# Patient Record
Sex: FEMALE | Race: WHITE | Employment: OTHER | URBAN - METROPOLITAN AREA
[De-identification: names, ages, dates, MRNs, and addresses within clinical notes are randomized per-mention and may not be internally consistent; named-entity substitution may affect disease eponyms.]

---

## 2018-07-23 ENCOUNTER — HOSPITAL ENCOUNTER (OUTPATIENT)
Dept: VASCULAR SURGERY | Age: 68
Discharge: HOME | End: 2018-07-23
Attending: FAMILY MEDICINE
Payer: MEDICARE

## 2018-07-23 DIAGNOSIS — R09.89 RIGHT CAROTID BRUIT: ICD-10-CM

## 2018-07-23 PROCEDURE — 93880 EXTRACRANIAL BILAT STUDY: CPT

## 2018-07-23 NOTE — PROCEDURES
[facility]  *** FINAL REPORT ***    Name: Sergo Garcia  MRN: [id number]    Outpatient  : [address] 1950  HIS Order #: [id number]  [id number] [facility] Visit #: [id number]  Date: 2018    TYPE OF TEST: Cerebrovascular Duplex    REASON FOR TEST  Carotid bruit (right hemisphere)    Right Carotid:-             Proximal               Mid                 Distal  cm/s  Systolic  Diastolic  Systolic  Diastolic  Systolic  Diastolic  CCA:    305.2      12.0                            59.0      13.6  Bulb:  ECA:     78.4      16.3  ICA:     41.0      11.1       46.0      17.5       58.1      16.8  ICA/CCA:  0.7       0.8    ICA Stenosis: <50%    Right Vertebral:-  Finding: Antegrade  Sys:       27.7  Marilou:        9.6    Right Subclavian: Stenotic    Left Carotid:-            Proximal                Mid                 Distal  cm/s  Systolic  Diastolic  Systolic  Diastolic  Systolic  Diastolic  CCA:     33.3      17.6                            73.3      17.6  Bulb:  ECA:     87.5      18.9  ICA:     31.6      12.9       34.9      15.1       68.6      25.0  ICA/CCA:  0.4       0.7    ICA Stenosis: <50%    Left Vertebral:-  Finding: Antegrade  Sys:       38.9  Marilou:        6.1    Left Subclavian:    INTERPRETATION/FINDINGS  PROCEDURE:  Evaluation of the extracranial cerebrovascular arteries  with ultrasound (B-mode imaging, pulsed Doppler, color Doppler). Includes the common carotid, internal carotid, external carotid, and  vertebral arteries. FINDINGS: Mild Heterogeneous plaque in the bilateral bulb, bilateral  ECA, and bilateral ICA. IMPRESSION: Findings are consistent with 0-49% stenosis of the right  internal carotid and 0-49% stenosis of the left internal carotid. Vertebrals are patent with antegrade flow. NOTE: Elevated velocities  with spectral broadening identified in the right subclaivian artey  consistent with stenosis.     ADDITIONAL COMMENTS    I have personally reviewed the data relevant to the interpretation of  this  study. TECHNOLOGIST: NKECHI Arita  Signed: 07/23/2018 10:50 AM    PHYSICIAN: Syed Herrera.  Del Pace MD  Signed: 07/23/2018 03:10 PM

## 2018-08-04 LAB
CREATININE, EXTERNAL: 0.59
HBA1C MFR BLD HPLC: 5 %
LDL-C, EXTERNAL: 75
MICROALBUMIN UR TEST STR-MCNC: 42.2 MG/DL

## 2018-08-10 PROBLEM — I10 ESSENTIAL HYPERTENSION: Status: ACTIVE | Noted: 2018-08-10

## 2018-08-10 PROBLEM — I65.29 CAROTID ARTERY STENOSIS: Status: ACTIVE | Noted: 2018-08-10

## 2018-08-10 RX ORDER — ESOMEPRAZOLE MAGNESIUM 40 MG/1
CAPSULE, DELAYED RELEASE ORAL DAILY
COMMUNITY
End: 2020-06-18 | Stop reason: SDUPTHER

## 2018-08-10 RX ORDER — LOSARTAN POTASSIUM 50 MG/1
TABLET ORAL DAILY
COMMUNITY
End: 2018-08-22 | Stop reason: SDUPTHER

## 2018-08-10 RX ORDER — GUAIFENESIN 100 MG/5ML
81 LIQUID (ML) ORAL DAILY
COMMUNITY
End: 2019-05-29

## 2018-08-22 ENCOUNTER — OFFICE VISIT (OUTPATIENT)
Dept: FAMILY MEDICINE CLINIC | Age: 68
End: 2018-08-22

## 2018-08-22 VITALS
RESPIRATION RATE: 16 BRPM | HEART RATE: 89 BPM | BODY MASS INDEX: 24.32 KG/M2 | TEMPERATURE: 97.7 F | DIASTOLIC BLOOD PRESSURE: 100 MMHG | OXYGEN SATURATION: 97 % | HEIGHT: 65 IN | SYSTOLIC BLOOD PRESSURE: 178 MMHG | WEIGHT: 146 LBS

## 2018-08-22 DIAGNOSIS — I65.21 STENOSIS OF RIGHT CAROTID ARTERY: Primary | ICD-10-CM

## 2018-08-22 DIAGNOSIS — I10 ESSENTIAL HYPERTENSION: ICD-10-CM

## 2018-08-22 RX ORDER — CHLORHEXIDINE GLUCONATE 1.2 MG/ML
RINSE ORAL
COMMUNITY
Start: 2018-07-25 | End: 2019-05-29

## 2018-08-22 RX ORDER — ESOMEPRAZOLE MAGNESIUM 40 MG/1
40 CAPSULE, DELAYED RELEASE ORAL
COMMUNITY
End: 2018-08-22 | Stop reason: SDUPTHER

## 2018-08-22 RX ORDER — LOSARTAN POTASSIUM 50 MG/1
100 TABLET ORAL DAILY
Qty: 90 TAB | Refills: 1 | Status: SHIPPED | OUTPATIENT
Start: 2018-08-22 | End: 2018-09-17 | Stop reason: SDUPTHER

## 2018-08-22 RX ORDER — IBUPROFEN 600 MG/1
TABLET ORAL
COMMUNITY
Start: 2018-07-25 | End: 2019-05-29

## 2018-08-22 RX ORDER — HYDROCODONE BITARTRATE AND ACETAMINOPHEN 7.5; 325 MG/1; MG/1
TABLET ORAL
COMMUNITY
Start: 2018-07-25 | End: 2018-08-22 | Stop reason: ALTCHOICE

## 2018-08-22 NOTE — MR AVS SNAPSHOT
[address] [place] 
[phone] Patient: Mata Lopez MRN: [id number]:[id number] Visit Information Date & Time Provider Department Dept. Phone Encounter #  
 8/22/2018  9:45 AM Christie Leavitt MD [facility] 144 [phone] [id number] Follow-up Instructions Return in about 4 weeks (around 9/19/2018) for for high blood pressure follow up. Follow-up and Disposition History Upcoming Health Maintenance Date Due Hepatitis C Screening 1950 DTaP/Tdap/Td series (1 - Tdap) 6/15/1972 BREAST CANCER SCRN MAMMOGRAM 6/15/2001 FOBT Q 1 YEAR AGE 50-75 6/15/2001 ZOSTER VACCINE AGE 60> 4/15/2011 GLAUCOMA SCREENING Q2Y 6/15/2016 Bone Densitometry (Dexa) Screening 6/15/2016 Pneumococcal 65+ Low/Medium Risk (1 of 2 - PCV13) 6/15/2016 MEDICARE YEARLY EXAM 7/19/2018 Influenza Age 5 to Adult 8/1/2018 Allergies as of 8/22/2018  Review Complete On: 8/22/2018 By: Christie Leavitt MD  
 No Known Allergies Current Immunizations  Never Reviewed No immunizations on file. Not reviewed this visit You Were Diagnosed With   
  
 Codes Comments Stenosis of right carotid artery    -  Primary ICD-10-CM: U71.06 ICD-9-CM: 433.10 Essential hypertension     ICD-10-CM: I10 
ICD-9-CM: 401.9 Vitals BP Pulse Temp Resp Height(growth percentile) Weight(growth percentile) (!) 178/100 (BP 1 Location: Left arm, BP Patient Position: Sitting) 89 97.7 °F (36.5 °C) (Oral) 16 5' 5\" (1.651 m) 146 lb (66.2 kg) SpO2 BMI OB Status Smoking Status 97% 24.3 kg/m2 Postmenopausal Current Every Day Smoker BMI and BSA Data Body Mass Index Body Surface Area  
 24.3 kg/m 2 1.74 m 2 Preferred Pharmacy Pharmacy Name Phone [facility] [address] [facility], [address] [place] [address], [address] [phone] Your Updated Medication List  
  
   
 This list is accurate as of 8/22/18 11:08 AM.  Always use your most recent med list.  
  
  
  
  
 aspirin 81 mg chewable tablet Take 81 mg by mouth daily. chlorhexidine 0.12 % solution Commonly known as:  PERIDEX  
  
 ibuprofen 600 mg tablet Commonly known as:  MOTRIN  
  
 losartan 50 mg tablet Commonly known as:  COZAAR Take 2 Tabs by mouth daily. NexIUM 40 mg capsule Generic drug:  esomeprazole Take  by mouth daily. Prescriptions Sent to Pharmacy Refills  
 losartan (COZAAR) 50 mg tablet 1 Sig: Take 2 Tabs by mouth daily. Class: Normal  
 Pharmacy: [facility], [address] [place] [address], [address] #: [phone] Route: Oral  
  
Follow-up Instructions Return in about 4 weeks (around 9/19/2018) for for high blood pressure follow up. Introducing [facility]! Ino Machuca introduces [employer] patient portal. Now you can access parts of your medical record, email your doctor's office, and request medication refills online. 1. In your internet browser, go to [email or web address] 2. Click on the First Time User? Click Here link in the Sign In box. You will see the New Member Sign Up page. 3. Enter your [employer] Access Code exactly as it appears below. You will not need to use this code after youve completed the sign-up process. If you do not sign up before the expiration date, you must request a new code. · [employer] Access Code: [email or web address] Expires: 10/21/2018 10:07 AM 
 
4. Enter the last four digits of your Social Security Number (xxxx) and Date of Birth (mm/dd/yyyy) as indicated and click Submit. You will be taken to the next sign-up page. 5. Create a [employer] ID. This will be your [employer] login ID and cannot be changed, so think of one that is secure and easy to remember. 6. Create a [employer] password. You can change your password at any time. 7. Enter your Password Reset Question and Answer. This can be used at a later time if you forget your password. 8. Enter your e-mail address. You will receive e-mail notification when new information is available in [address] 9. Click Sign Up. You can now view and download portions of your medical record. 10. Click the Download Summary menu link to download a portable copy of your medical information. If you have questions, please visit the Frequently Asked Questions section of the [employer] website. Remember, [employer] is NOT to be used for urgent needs. For medical emergencies, dial 911. Now available from your iPhone and Android! Please provide this summary of care documentation to your next provider. Your primary care clinician is listed as Sanjuan Opitz. If you have any questions after today's visit, please call [phone].

## 2018-08-22 NOTE — PROGRESS NOTES
Pt here to follow-up of hypertension and carotid dopple study and determine medication refill and adjustments and appropriate lab evaluation. Pt is compliant with medications and no new side effects. Chief Complaint   Patient presents with    Medication Management     was started on 2 new meds at last apt. here to follow up          Past Medical History:   Diagnosis Date    Depression     Hypertension          Current Outpatient Prescriptions   Medication Sig Dispense Refill    losartan (COZAAR) 50 mg tablet Take 2 Tabs by mouth daily. 90 Tab 1    aspirin 81 mg chewable tablet Take 81 mg by mouth daily.  chlorhexidine (PERIDEX) 0.12 % solution       ibuprofen (MOTRIN) 600 mg tablet       esomeprazole (NEXIUM) 40 mg capsule Take  by mouth daily. Requested Prescriptions     Signed Prescriptions Disp Refills    losartan (COZAAR) 50 mg tablet 90 Tab 1     Sig: Take 2 Tabs by mouth daily. Review of Systems   Constitutional: Negative for fever. Respiratory: Negative for shortness of breath. Cardiovascular: Negative for chest pain, orthopnea and PND. Gastrointestinal: Negative for abdominal pain, nausea and vomiting. Physical Exam   Constitutional: She is oriented to person, place, and time and well-developed, well-nourished, and in no distress. Cardiovascular: Normal rate, regular rhythm and normal heart sounds. Pulmonary/Chest: Effort normal and breath sounds normal. No respiratory distress. She has no wheezes. Neurological: She is alert and oriented to person, place, and time. Skin: Skin is warm and dry. 1. Stenosis of right carotid artery  Carotid doppers showed no significant stenosis. We will continueon aspirin. 2. Essential hypertension  Not well controlled. Increase Losartan. Plan:     See us in 4 weeks.

## 2018-08-22 NOTE — PROGRESS NOTES
1. Have you been to the ER, urgent care clinic since your last visit? Hospitalized since your last visit? No    2. Have you seen or consulted any other health care providers outside of the [facility] since your last visit? Include any pap smears or colon screening. No      Patient reports  and brother in law were killed in a car accident in March, reports depression, increased alcohol ,lack of appetite and insomnia self treating with marijuana us. Patient report anxiety.  Reports she cut herself in the past.

## 2018-09-18 RX ORDER — LOSARTAN POTASSIUM 50 MG/1
100 TABLET ORAL DAILY
Qty: 90 TAB | Refills: 1 | Status: SHIPPED | OUTPATIENT
Start: 2018-09-18 | End: 2019-04-15 | Stop reason: SDUPTHER

## 2019-05-29 ENCOUNTER — OFFICE VISIT (OUTPATIENT)
Dept: FAMILY MEDICINE CLINIC | Age: 69
End: 2019-05-29

## 2019-05-29 VITALS
HEIGHT: 65 IN | SYSTOLIC BLOOD PRESSURE: 149 MMHG | WEIGHT: 146 LBS | RESPIRATION RATE: 16 BRPM | OXYGEN SATURATION: 96 % | TEMPERATURE: 98 F | HEART RATE: 86 BPM | BODY MASS INDEX: 24.32 KG/M2 | DIASTOLIC BLOOD PRESSURE: 92 MMHG

## 2019-05-29 DIAGNOSIS — J30.1 SEASONAL ALLERGIC RHINITIS DUE TO POLLEN: ICD-10-CM

## 2019-05-29 DIAGNOSIS — I65.21 STENOSIS OF RIGHT CAROTID ARTERY: ICD-10-CM

## 2019-05-29 DIAGNOSIS — I10 ESSENTIAL HYPERTENSION: Primary | ICD-10-CM

## 2019-05-29 RX ORDER — LOSARTAN POTASSIUM AND HYDROCHLOROTHIAZIDE 12.5; 1 MG/1; MG/1
1 TABLET ORAL DAILY
Qty: 90 TAB | Refills: 1 | Status: SHIPPED | OUTPATIENT
Start: 2019-05-29 | End: 2020-06-18 | Stop reason: SDUPTHER

## 2019-05-29 RX ORDER — ROSUVASTATIN CALCIUM 5 MG/1
5 TABLET, COATED ORAL
Qty: 90 TAB | Refills: 1 | Status: SHIPPED | OUTPATIENT
Start: 2019-05-29 | End: 2020-06-18 | Stop reason: SDUPTHER

## 2019-05-29 RX ORDER — BISMUTH SUBSALICYLATE 262 MG
1 TABLET,CHEWABLE ORAL DAILY
COMMUNITY

## 2019-05-29 RX ORDER — ESOMEPRAZOLE MAGNESIUM 40 MG/1
40 CAPSULE, DELAYED RELEASE ORAL
COMMUNITY
End: 2019-05-29

## 2019-05-29 NOTE — PATIENT INSTRUCTIONS
Decrease or stop alcohol Stop smoking !!! For allergies Claritin (Generic OTC) Flonase (generic OTC) Hydrocortisone daily around eyes Niurka Teixeira made stress B complex for skin cancer prevention Restart aspirin Start cholesterol medicine and new BP medicine

## 2019-05-29 NOTE — PROGRESS NOTES
Assessment and Plan    1. Essential hypertension  Not at goal  Stop smoking  Decrease or stop ETOH  Stop current losartan and start:  - losartan-hydroCHLOROthiazide (HYZAAR) 100-12.5 mg per tablet; Take 1 Tab by mouth daily. Dispense: 90 Tab; Refill: 1    2. Stenosis of right carotid artery  Resume aspirin,   Start statin  - rosuvastatin (CRESTOR) 5 mg tablet; Take 1 Tab by mouth nightly. Dispense: 90 Tab; Refill: 1    3. Seasonal allergic rhinitis due to pollen  Claritin, flonase, hydrocortisone    Nicatinamide for skin CA prevention      Follow-up and Dispositions    · Return in about 1 month (around 6/29/2019) for Blood pressure follow up. Diagnosis and plan discussed with patient who verbillized understanding. History of present Margareth Ponce is a 80 y.o. female presenting for Follow-up (Medication ) and Allergies    Hypertension  Not at goal here. On losartan only  6 light beers per night    Carotid artery stenosis  Stopped ASA due to stomach upset  Still smoking  Not on chol meds    Allergic rhinitis  Currently untreated. Usually spring only  Watery eyes, swollen lids and runny nose    Review of Systems   Respiratory: Negative for shortness of breath. Cardiovascular: Negative for chest pain, palpitations and leg swelling. Gastrointestinal: Negative for abdominal pain, blood in stool and constipation. Genitourinary: Negative for hematuria. Neurological: Negative for dizziness, speech change, focal weakness, loss of consciousness, weakness and headaches. Psychiatric/Behavioral: Positive for depression (still grieving loss of  in MVA). The patient is nervous/anxious and has insomnia.           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    HX ORTHOPAEDIC      Metal plates in neck    HX OTHER SURGICAL      eye surgery    HX TONSILLECTOMY       Family History   Problem Relation Age of Onset    Heart Disease Father      Social History     Socioeconomic History    Marital status:      Spouse name: Not on file    Number of children: Not on file    Years of education: Not on file    Highest education level: Not on file   Occupational History    Not on file   Social Needs    Financial resource strain: Not on file    Food insecurity:     Worry: Not on file     Inability: Not on file    Transportation needs:     Medical: Not on file     Non-medical: Not on file   Tobacco Use    Smoking status: Current Every Day Smoker     Packs/day: 1.00     Years: 55.00     Pack years: 55.00    Smokeless tobacco: Never Used   Substance and Sexual Activity    Alcohol use:  Yes     Alcohol/week: 14.4 oz     Types: 24 Cans of beer per week     Comment: 24 beer/ week, doubled intake since  passed    Drug use: Yes     Frequency: 7.0 times per week     Types: Marijuana     Comment: Patient states she smokes marijuana daily    Sexual activity: Not Currently     Partners: Male   Lifestyle    Physical activity:     Days per week: Not on file     Minutes per session: Not on file    Stress: Not on file   Relationships    Social connections:     Talks on phone: Not on file     Gets together: Not on file     Attends Jew service: Not on file     Active member of club or organization: Not on file     Attends meetings of clubs or organizations: Not on file     Relationship status: Not on file    Intimate partner violence:     Fear of current or ex partner: Not on file     Emotionally abused: Not on file     Physically abused: Not on file     Forced sexual activity: Not on file   Other Topics Concern    Not on file   Social History Narrative    Not on file         Current Outpatient Medications   Medication Sig Dispense Refill    ferrous sulfate (SLOW FE) 142 mg (45 mg iron) ER tablet Take  by mouth Daily (before breakfast).  multivitamin (ONE A DAY) tablet Take 1 Tab by mouth daily.  losartan-hydroCHLOROthiazide (HYZAAR) 100-12.5 mg per tablet Take 1 Tab by mouth daily. 90 Tab 1    rosuvastatin (CRESTOR) 5 mg tablet Take 1 Tab by mouth nightly. 90 Tab 1    esomeprazole (NEXIUM) 40 mg capsule Take  by mouth daily. No Known Allergies    Vitals:    05/29/19 0930   BP: (!) 149/92   Pulse: 86   Resp: 16   Temp: 98 °F (36.7 °C)   TempSrc: Oral   SpO2: 96%   Weight: 146 lb (66.2 kg)   Height: 5' 5\" (1.651 m)     Body mass index is 24.3 kg/m². Objective  General: Patient alert and oriented and in NAD, smells of tobacco  Eyes: PER/EOMI, no conjunctival pallor or scleral icterus. Swollen edematous lids with injection of conj bilat  ENT: Nares normal with clear rhinorrhea, TM's clear with normal architecture and light reflex, Mouth normal, throat without exudate, uvula midline  Neck: No thyromegaly or cervical lymphadenopathy  Cardiovascular: Heart has regular rate and rhythm, No murmurs, rubs or gallops. No edema + right carotid bruit  Respiratory: Lungs are clear to auscultation bilaterally, no wheezing, rales or rhonchi, normal chest excursion and no increased work of breathing. Musculoskeletal: All four extremities present and functional.   Skin: No rashes or lesions noted on exposed skin  Neuro: AAOx3, normal gait and speech. No gross neurologic deficits. Psych: Appropriate mood and affect, no homicidal or suicidal ideation, no obsessions, delusions or hallucinations, normal psychomotor status.

## 2019-05-29 NOTE — PROGRESS NOTES
Berneda Leyden is a 80 y.o. female      Chief Complaint   Patient presents with    Follow-up     Medication     Allergies         1. Have you been to the ER, urgent care clinic since your last visit? Hospitalized since your last visit? no      2. Have you seen or consulted any other health care providers outside of the [address] [place] [address] since your last visit? Include any pap smears or colon screening. no

## 2020-06-17 DIAGNOSIS — I10 ESSENTIAL HYPERTENSION: ICD-10-CM

## 2020-06-17 DIAGNOSIS — K21.9 GASTROESOPHAGEAL REFLUX DISEASE WITHOUT ESOPHAGITIS: Primary | ICD-10-CM

## 2020-06-17 RX ORDER — LOSARTAN POTASSIUM AND HYDROCHLOROTHIAZIDE 12.5; 1 MG/1; MG/1
TABLET ORAL
Qty: 90 TAB | Refills: 1 | OUTPATIENT
Start: 2020-06-17

## 2020-06-18 ENCOUNTER — VIRTUAL VISIT (OUTPATIENT)
Dept: FAMILY MEDICINE CLINIC | Age: 70
End: 2020-06-18

## 2020-06-18 DIAGNOSIS — I10 ESSENTIAL HYPERTENSION: Primary | ICD-10-CM

## 2020-06-18 DIAGNOSIS — K21.9 GASTROESOPHAGEAL REFLUX DISEASE WITHOUT ESOPHAGITIS: ICD-10-CM

## 2020-06-18 DIAGNOSIS — I65.21 STENOSIS OF RIGHT CAROTID ARTERY: ICD-10-CM

## 2020-06-18 RX ORDER — ASPIRIN 81 MG/1
TABLET ORAL DAILY
COMMUNITY

## 2020-06-18 RX ORDER — ROSUVASTATIN CALCIUM 5 MG/1
5 TABLET, COATED ORAL
Qty: 90 TAB | Refills: 1 | Status: SHIPPED | OUTPATIENT
Start: 2020-06-18 | End: 2020-12-17

## 2020-06-18 RX ORDER — LOSARTAN POTASSIUM AND HYDROCHLOROTHIAZIDE 12.5; 1 MG/1; MG/1
1 TABLET ORAL DAILY
Qty: 90 TAB | Refills: 1 | Status: SHIPPED | OUTPATIENT
Start: 2020-06-18 | End: 2020-10-12 | Stop reason: DRUGHIGH

## 2020-06-18 RX ORDER — ESOMEPRAZOLE MAGNESIUM 40 MG/1
40 CAPSULE, DELAYED RELEASE ORAL DAILY
Qty: 90 CAP | Refills: 1 | Status: SHIPPED | OUTPATIENT
Start: 2020-06-18 | End: 2020-06-25 | Stop reason: ALTCHOICE

## 2020-06-18 NOTE — PROGRESS NOTES
Kun Zacarias is a 72 y.o. female evaluated via telephone on 6/18/2020. Consent:  She and/or health care decision maker is aware that she may receive a bill for this telephone service, depending on her insurance coverage, and has provided verbal consent to proceed: Yes      Documentation:  I communicated with the patient and/or health care decision maker about medications, BP, and other issues. Details of this discussion including any medical advice provided:     Hypertension  Stopped all her meds, ran out  BP elevated at home to 167/99    Hypercholesterolelmia  Out of that med too    GERD  Lots of heartburn. Out of PPI  Still smoking but less ETOH than in past.    Lots of other sx: intermittent diarrhea, chest pain and burning, anxiety about health and COVID, Stress, house has water damage. On discussion, she seems stressed and thinking and speech seems somewhat disjointed and tangential.  Discussed that for severe chest pain she should go to ER especially if continues with resumption of PPI  Will see in office to review these multiple issues in person    Assessment and Plan:    1. Stenosis of right carotid artery  Resume statin  - rosuvastatin (CRESTOR) 5 mg tablet; Take 1 Tab by mouth nightly. Dispense: 90 Tab; Refill: 1    2. Essential hypertension  Resume BP meds for stroke prevention  - losartan-hydroCHLOROthiazide (HYZAAR) 100-12.5 mg per tablet; Take 1 Tab by mouth daily. Dispense: 90 Tab; Refill: 1    3. Gastroesophageal reflux disease without esophagitis  Symptomatic currently. - esomeprazole (NexIUM) 40 mg capsule; Take 1 Cap by mouth daily. Dispense: 90 Cap; Refill: 1      Follow-up and Dispositions    · Return in about 10 days (around 6/28/2020) for Medication follow up, Blood pressure follow up. I affirm this is a Patient Initiated Episode with a Patient who has not had a related appointment within my department in the past 7 days or scheduled within the next 24 hours.     Total Time: minutes: 21-30 minutes    Note: not billable if this call serves to triage the patient into an appointment for the relevant concern    The patient was at home and I was in office for this phone visit.     Selma Arias MD

## 2020-06-25 RX ORDER — OMEPRAZOLE 40 MG/1
40 CAPSULE, DELAYED RELEASE ORAL DAILY
Qty: 90 CAP | Refills: 1 | Status: SHIPPED | OUTPATIENT
Start: 2020-06-25

## 2020-06-25 NOTE — TELEPHONE ENCOUNTER
06/25/20  9:15 AM    1. Gastroesophageal reflux disease without esophagitis  Replaces Nexium  - omeprazole (PRILOSEC) 40 mg capsule; Take 1 Cap by mouth daily. Dispense: 90 Cap;  Refill: 1      Paulie Peres MD

## 2020-10-12 ENCOUNTER — OFFICE VISIT (OUTPATIENT)
Dept: FAMILY MEDICINE CLINIC | Age: 70
End: 2020-10-12
Payer: MEDICARE

## 2020-10-12 VITALS
DIASTOLIC BLOOD PRESSURE: 106 MMHG | HEART RATE: 87 BPM | SYSTOLIC BLOOD PRESSURE: 158 MMHG | RESPIRATION RATE: 12 BRPM | TEMPERATURE: 97.1 F | OXYGEN SATURATION: 100 %

## 2020-10-12 DIAGNOSIS — F33.2 SEVERE EPISODE OF RECURRENT MAJOR DEPRESSIVE DISORDER, WITHOUT PSYCHOTIC FEATURES (HCC): ICD-10-CM

## 2020-10-12 DIAGNOSIS — H60.543 ECZEMA OF EXTERNAL EAR, BILATERAL: ICD-10-CM

## 2020-10-12 DIAGNOSIS — I10 ESSENTIAL HYPERTENSION: Primary | ICD-10-CM

## 2020-10-12 DIAGNOSIS — Z23 ENCOUNTER FOR IMMUNIZATION: ICD-10-CM

## 2020-10-12 PROCEDURE — G8536 NO DOC ELDER MAL SCRN: HCPCS | Performed by: FAMILY MEDICINE

## 2020-10-12 PROCEDURE — 3017F COLORECTAL CA SCREEN DOC REV: CPT | Performed by: FAMILY MEDICINE

## 2020-10-12 PROCEDURE — G8427 DOCREV CUR MEDS BY ELIG CLIN: HCPCS | Performed by: FAMILY MEDICINE

## 2020-10-12 PROCEDURE — G8755 DIAS BP > OR = 90: HCPCS | Performed by: FAMILY MEDICINE

## 2020-10-12 PROCEDURE — 1101F PT FALLS ASSESS-DOCD LE1/YR: CPT | Performed by: FAMILY MEDICINE

## 2020-10-12 PROCEDURE — G8421 BMI NOT CALCULATED: HCPCS | Performed by: FAMILY MEDICINE

## 2020-10-12 PROCEDURE — 99214 OFFICE O/P EST MOD 30 MIN: CPT | Performed by: FAMILY MEDICINE

## 2020-10-12 PROCEDURE — G8753 SYS BP > OR = 140: HCPCS | Performed by: FAMILY MEDICINE

## 2020-10-12 PROCEDURE — G8511 SCR DEP POS, NO PLAN DOC RNG: HCPCS | Performed by: FAMILY MEDICINE

## 2020-10-12 PROCEDURE — G8400 PT W/DXA NO RESULTS DOC: HCPCS | Performed by: FAMILY MEDICINE

## 2020-10-12 PROCEDURE — 1090F PRES/ABSN URINE INCON ASSESS: CPT | Performed by: FAMILY MEDICINE

## 2020-10-12 RX ORDER — TRIAMCINOLONE ACETONIDE 1 MG/G
OINTMENT TOPICAL 2 TIMES DAILY
Qty: 30 G | Refills: 0 | Status: SHIPPED | OUTPATIENT
Start: 2020-10-12

## 2020-10-12 RX ORDER — ESCITALOPRAM OXALATE 10 MG/1
10 TABLET ORAL DAILY
Qty: 30 TAB | Refills: 0 | Status: SHIPPED | OUTPATIENT
Start: 2020-10-12 | End: 2020-11-17 | Stop reason: SDUPTHER

## 2020-10-12 RX ORDER — HYDROXYZINE 25 MG/1
25-50 TABLET, FILM COATED ORAL
Qty: 30 TAB | Refills: 0 | Status: SHIPPED | OUTPATIENT
Start: 2020-10-12 | End: 2020-11-17 | Stop reason: SDUPTHER

## 2020-10-12 RX ORDER — LOSARTAN POTASSIUM AND HYDROCHLOROTHIAZIDE 25; 100 MG/1; MG/1
1 TABLET ORAL DAILY
Qty: 90 TAB | Refills: 0 | Status: SHIPPED | OUTPATIENT
Start: 2020-10-12 | End: 2020-11-17 | Stop reason: SDUPTHER

## 2020-10-12 NOTE — PROGRESS NOTES
Identified pt with two pt identifiers(name and ). Reviewed record in preparation for visit and have obtained necessary documentation. Chief Complaint   Patient presents with    Hypertension     Discuss high bp    Dry Skin     Discuss Alternative for cream     Anxiety     Discuss Lexapro         Health Maintenance Due   Topic    Hepatitis C Screening     DTaP/Tdap/Td series (1 - Tdap)    Shingrix Vaccine Age 50> (1 of 2)    Breast Cancer Screen Mammogram     FOBT Q1Y Age 54-65    Vannie Pulling GLAUCOMA SCREENING Q2Y     Bone Densitometry (Dexa) Screening     Pneumococcal 65+ years (1 of 1 - PPSV23)    Medicare Yearly Exam     Lipid Screen     Flu Vaccine (1)       Coordination of Care Questionnaire:  :   1) Have you been to an emergency room, urgent care, or hospitalized since your last visit? If yes, where when, and reason for visit? no      2. Have seen or consulted any other health care provider since your last visit? If yes, where when, and reason for visit? no        Patient is accompanied by self I have received verbal consent from Antonio Kong to discuss any/all medical information while they are present in the room.

## 2020-10-12 NOTE — PROGRESS NOTES
Antonio Kong  72 y.o. female  1950  [id number]    [facility]  Progress Note     Encounter Date: 10/12/2020    Assessment and Plan:     Encounter Diagnoses     ICD-10-CM ICD-9-CM   1. Essential hypertension  I10 401.9   2. Eczema of external ear, bilateral  H60.543 380.22   3. Severe episode of recurrent major depressive disorder, without psychotic features ([facility] [address]  F33.2 296.33   4. Encounter for immunization  Z23 V03.89       1. Essential hypertension  Increase dose of HCTZ. Recheck labs in 1 week with labs to be done at that time. - losartan-hydroCHLOROthiazide (HYZAAR) 100-25 mg per tablet; Take 1 Tab by mouth daily. Dispense: 90 Tab; Refill: 0    2. Eczema of external ear, bilateral  - triamcinolone acetonide (KENALOG) 0.1 % ointment; Apply  to affected area two (2) times a day. use thin layer  Dispense: 30 g; Refill: 0    3. Severe episode of recurrent major depressive disorder, without psychotic features [facility])  Patient looking into group seessions. Start medication. Close follow up in clinic in 1 week. - escitalopram oxalate (LEXAPRO) 10 mg tablet; Take 1 Tab by mouth daily. Dispense: 30 Tab; Refill: 0  - hydrOXYzine HCL (ATARAX) 25 mg tablet; Take 1-2 Tabs by mouth three (3) times daily as needed for Anxiety. Dispense: 30 Tab; Refill: 0    4. Encounter for immunization  - pneumococcal 23-karyn ps vaccine (PNEUMOVAX 23) 25 mcg/0.5 mL injection; 0.5 mL by IntraMUSCular route once for 1 dose. Please fax confirmation to the attn of prescribing provider at above fax number. Indications: prevention of Streptococcus pneumoniae infection  Dispense: 0.5 mL; Refill: 0      I have discussed the diagnosis with the patient and the intended plan as seen in the above orders. she has expressed understanding. The patient has received an after-visit summary and questions were answered concerning future plans.   I have discussed medication side effects and warnings with the patient as well. Electronically Signed: Leon Hackett MD    Current Medications after this visit     Current Outpatient Medications   Medication Sig    esomeprazole magnesium (NEXIUM PO) Take  by mouth.  losartan-hydroCHLOROthiazide (HYZAAR) 100-25 mg per tablet Take 1 Tab by mouth daily.  triamcinolone acetonide (KENALOG) 0.1 % ointment Apply  to affected area two (2) times a day. use thin layer    escitalopram oxalate (LEXAPRO) 10 mg tablet Take 1 Tab by mouth daily.  hydrOXYzine HCL (ATARAX) 25 mg tablet Take 1-2 Tabs by mouth three (3) times daily as needed for Anxiety.  pneumococcal 23-karyn ps vaccine (PNEUMOVAX 23) 25 mcg/0.5 mL injection 0.5 mL by IntraMUSCular route once for 1 dose. Please fax confirmation to the attn of prescribing provider at above fax number. Indications: prevention of Streptococcus pneumoniae infection    omeprazole (PRILOSEC) 40 mg capsule Take 1 Cap by mouth daily.  aspirin delayed-release 81 mg tablet Take  by mouth daily.  ferrous sulfate (SLOW FE) 142 mg (45 mg iron) ER tablet Take  by mouth Daily (before breakfast).  multivitamin (ONE A DAY) tablet Take 1 Tab by mouth daily.  rosuvastatin (CRESTOR) 5 mg tablet Take 1 Tab by mouth nightly. No current facility-administered medications for this visit. Medications Discontinued During This Encounter   Medication Reason    losartan-hydroCHLOROthiazide (HYZAAR) 100-12.5 mg per tablet Dose Adjustment     ~~~~~~~~~~~~~~~~~~~~~~~~~~~~~~~~~~~~~~~~~~~~~~    Chief Complaint   Patient presents with    Hypertension     Discuss high bp    Dry Skin     Discuss Alternative for cream     Anxiety     Discuss Lexapro        History provided by patient and daughter. History of Present Illness   Stefani Aguayo is a 72 y.o. female who presents to clinic today for:    Anxiety Review:  Patient is seen for anxiety disorder. Current treatment includes none and no other therapies.  Reported side effects from the treatment: n/a  Prior treatments: Prozac  3 most recent PHQ Screens 10/12/2020   Little interest or pleasure in doing things Nearly every day   Feeling down, depressed, irritable, or hopeless Nearly every day   Total Score PHQ 2 6   Trouble falling or staying asleep, or sleeping too much Nearly every day   Feeling tired or having little energy Nearly every day   Poor appetite, weight loss, or overeating Nearly every day   Feeling bad about yourself - or that you are a failure or have let yourself or your family down Nearly every day   Trouble concentrating on things such as school, work, reading, or watching TV Nearly every day   Moving or speaking so slowly that other people could have noticed; or the opposite being so fidgety that others notice Nearly every day   Thoughts of being better off dead, or hurting yourself in some way Nearly every day   PHQ 9 Score 27   How difficult have these problems made it for you to do your work, take care of your home and get along with others -     OLIVIA 2/7 10/12/2020   Feeling nervous, anxious or on edge? 3   Not being able to stop or control worrying? 3   OLIVIA-2 Subtotal 6   Worrying too much about different things? 3   Trouble relaxing? 3   Being so restless that it is hard to sit still? 3   Becoming easily annoyed or irritable? 3   Feeling afraid as if something awful might happen? 3   OLIVIA-7 Total Score 21           Hypertension: Uncontrolled   BP Readings from Last 3 Encounters:   10/12/20 (!) 158/106   05/29/19 (!) 149/92   08/22/18 (!) 178/100     The patient reports:  taking medications as instructed, no medication side effects noted, no TIA's, no chest pain on exertion, no dyspnea on exertion, no swelling of ankles. Home monitoring:No    Eczema  Patient present with cc of eczema. Patient has been applying hydrocortisone. Health Maintenance  Asked patient to schedule a Wellness appt to discuss issues.   Health Maintenance Due   Topic Date Due    Hepatitis C Screening  1950    DTaP/Tdap/Td series (1 - Tdap) 06/15/1972    Shingrix Vaccine Age 50> (1 of 2) 06/15/2001    Breast Cancer Screen Mammogram  06/15/2001    FOBT Q1Y Age 50-75  06/15/2001    GLAUCOMA SCREENING Q2Y  06/15/2016    Bone Densitometry (Dexa) Screening  06/15/2016    Pneumococcal 65+ years (1 of 1 - PPSV23) 06/15/2016    Medicare Yearly Exam  07/19/2018    Lipid Screen  08/04/2019    Flu Vaccine (1) 09/01/2020     Review of Systems   Review of Systems   Constitutional: Negative for chills and fever. HENT: Negative for congestion, ear discharge and sore throat. Eyes: Negative for double vision, photophobia and discharge. Respiratory: Negative for cough, sputum production, shortness of breath and wheezing. Cardiovascular: Negative for chest pain, palpitations and leg swelling. Gastrointestinal: Negative for diarrhea, nausea and vomiting. Genitourinary: Negative for dysuria and urgency. Skin: Positive for itching and rash. Neurological: Positive for headaches. Negative for dizziness, tingling and tremors. Psychiatric/Behavioral: Positive for depression. Negative for memory loss, substance abuse and suicidal ideas. The patient is nervous/anxious and has insomnia. Vitals/Objective:     Vitals:    10/12/20 1440 10/12/20 1511   BP: (!) 189/109 (!) 158/106   Pulse: 87    Resp: 12    Temp: 97.1 °F (36.2 °C)    SpO2: 100%      There is no height or weight on file to calculate BMI. Wt Readings from Last 3 Encounters:   05/29/19 146 lb (66.2 kg)   08/22/18 146 lb (66.2 kg)       Physical Exam  Constitutional:       General: She is not in acute distress. Appearance: Normal appearance. She is well-developed and normal weight. She is not ill-appearing, toxic-appearing or diaphoretic. HENT:      Head: Normocephalic and atraumatic.       Right Ear: External ear normal.      Left Ear: External ear normal.      Mouth/Throat:      Pharynx: No oropharyngeal exudate or posterior oropharyngeal erythema. Eyes:      General:         Right eye: No discharge. Left eye: No discharge. Conjunctiva/sclera: Conjunctivae normal.   Cardiovascular:      Rate and Rhythm: Normal rate and regular rhythm. Heart sounds: S1 normal and S2 normal. No murmur. Pulmonary:      Effort: Pulmonary effort is normal.      Breath sounds: Normal breath sounds. No rales. Musculoskeletal:      Right lower leg: No edema. Left lower leg: No edema. Skin:     General: Skin is warm and dry. Findings: Rash ( external ear bilaterally dry and cracking with serous drianage) present. Neurological:      Mental Status: She is alert and oriented to person, place, and time. Psychiatric:         Mood and Affect: Mood is anxious and depressed. Affect is labile and tearful. Affect is not blunt, flat, angry or inappropriate. Behavior: Behavior normal.         No results found for this or any previous visit (from the past 24 hour(s)). Disposition     Follow-up and Dispositions  ·   Return in about 1 week (around 10/19/2020) for f/u mood. .       No future appointments. History   Patient's past medical, surgical and family histories were reviewed and updated.     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    HX ORTHOPAEDIC      Metal plates in neck    HX OTHER SURGICAL      eye surgery    HX TONSILLECTOMY       Family History   Problem Relation Age of Onset    Heart Disease Father      Social History     Tobacco Use    Smoking status: Current Every Day Smoker     Packs/day: 1.00     Years: 55.00     Pack years: 55.00    Smokeless tobacco: Never Used   Substance Use Topics    Alcohol use:  Yes     Alcohol/week: 24.0 standard drinks     Types: 24 Cans of beer per week     Comment: 24 beer/ week, doubled intake since  passed    Drug use: Yes     Frequency: 7.0 times per week     Types: Marijuana     Comment: Patient states she smokes marijuana       Allergies   No Known Allergies

## 2020-11-10 ENCOUNTER — TELEPHONE (OUTPATIENT)
Dept: FAMILY MEDICINE CLINIC | Age: 70
End: 2020-11-10

## 2020-11-10 NOTE — TELEPHONE ENCOUNTER
----- Message from Alejandra Juana sent at 11/9/2020  1:17 PM EST -----  Regarding: Dr. Radha Rosario  er (if not patient): N/A  Relationship of caller (if not patient): N/A  Best contact number(s): [phone]  Name of medication and dosage if known: \"Hydroxyzine\" 25mg  Is patient out of this medication (yes/no): No but has 1 week left  Pharmacy name: [facility]  Pharmacy listed in chart? (yes/no): Yes  Pharmacy phone number: [phone]  Date of last visit: 10/12/2020  Details to clarify the request: Pt would like a 90 day supply.

## 2020-11-10 NOTE — TELEPHONE ENCOUNTER
----- Message from Stephen Mendoza sent at 11/9/2020  1:18 PM EST -----  Regarding: : Dr. Karen Martínez (if not patient): N/A  Relationship of caller (if not patient): N/A  Best contact number(s): [phone]  Name of medication and dosage if known: \"Escitalopram\" 10 mg  Is patient out of this medication (yes/no): No but has 1 week left  Pharmacy name: [facility]  Pharmacy listed in chart? (yes/no): Yes  Pharmacy phone number: [phone]  Date of last visit: 10/12/2020  Details to clarify the request: Pt would like a 90 day supply.

## 2020-11-10 NOTE — TELEPHONE ENCOUNTER
Patient was just started on the 2 requested medications on 10/12/2020. SHe was supposed to follow up in 1 week. I do not give 90-days supplies for those medication without a follow up appointment.

## 2020-11-17 ENCOUNTER — VIRTUAL VISIT (OUTPATIENT)
Dept: FAMILY MEDICINE CLINIC | Age: 70
End: 2020-11-17
Payer: MEDICARE

## 2020-11-17 DIAGNOSIS — F33.2 SEVERE EPISODE OF RECURRENT MAJOR DEPRESSIVE DISORDER, WITHOUT PSYCHOTIC FEATURES (HCC): ICD-10-CM

## 2020-11-17 DIAGNOSIS — I10 ESSENTIAL HYPERTENSION: ICD-10-CM

## 2020-11-17 DIAGNOSIS — H60.393 OTHER INFECTIVE ACUTE OTITIS EXTERNA OF BOTH EARS: Primary | ICD-10-CM

## 2020-11-17 PROCEDURE — G8427 DOCREV CUR MEDS BY ELIG CLIN: HCPCS | Performed by: FAMILY MEDICINE

## 2020-11-17 PROCEDURE — 99214 OFFICE O/P EST MOD 30 MIN: CPT | Performed by: FAMILY MEDICINE

## 2020-11-17 PROCEDURE — G8421 BMI NOT CALCULATED: HCPCS | Performed by: FAMILY MEDICINE

## 2020-11-17 PROCEDURE — 3017F COLORECTAL CA SCREEN DOC REV: CPT | Performed by: FAMILY MEDICINE

## 2020-11-17 PROCEDURE — 1090F PRES/ABSN URINE INCON ASSESS: CPT | Performed by: FAMILY MEDICINE

## 2020-11-17 PROCEDURE — G8536 NO DOC ELDER MAL SCRN: HCPCS | Performed by: FAMILY MEDICINE

## 2020-11-17 PROCEDURE — G8756 NO BP MEASURE DOC: HCPCS | Performed by: FAMILY MEDICINE

## 2020-11-17 PROCEDURE — G8432 DEP SCR NOT DOC, RNG: HCPCS | Performed by: FAMILY MEDICINE

## 2020-11-17 PROCEDURE — 1101F PT FALLS ASSESS-DOCD LE1/YR: CPT | Performed by: FAMILY MEDICINE

## 2020-11-17 PROCEDURE — G8400 PT W/DXA NO RESULTS DOC: HCPCS | Performed by: FAMILY MEDICINE

## 2020-11-17 RX ORDER — HYDROXYZINE 25 MG/1
25-50 TABLET, FILM COATED ORAL
Qty: 90 TAB | Refills: 0 | Status: SHIPPED | OUTPATIENT
Start: 2020-11-17 | End: 2020-12-10 | Stop reason: SDUPTHER

## 2020-11-17 RX ORDER — NEOMYCIN SULFATE, POLYMYXIN B SULFATE AND HYDROCORTISONE 10; 3.5; 1 MG/ML; MG/ML; [USP'U]/ML
3 SUSPENSION/ DROPS AURICULAR (OTIC) 4 TIMES DAILY
Qty: 10 ML | Refills: 0 | Status: SHIPPED | OUTPATIENT
Start: 2020-11-17 | End: 2020-11-27

## 2020-11-17 RX ORDER — ESCITALOPRAM OXALATE 10 MG/1
10 TABLET ORAL DAILY
Qty: 90 TAB | Refills: 0 | Status: SHIPPED | OUTPATIENT
Start: 2020-11-17 | End: 2021-02-08

## 2020-11-17 RX ORDER — AZELASTINE 1 MG/ML
1 SPRAY, METERED NASAL 2 TIMES DAILY
Qty: 1 BOTTLE | Refills: 2 | Status: SHIPPED | OUTPATIENT
Start: 2020-11-17

## 2020-11-17 RX ORDER — LOSARTAN POTASSIUM AND HYDROCHLOROTHIAZIDE 25; 100 MG/1; MG/1
1 TABLET ORAL DAILY
Qty: 90 TAB | Refills: 0 | Status: SHIPPED | OUTPATIENT
Start: 2020-11-17

## 2020-11-17 NOTE — PROGRESS NOTES
Richie Grayson 72 y.o. female 1950 [id number] 1 Lanie Lechuga Progress Note Encounter Date: 11/17/2020 Assessment and Plan:  
No diagnosis found. *** There are no diagnoses linked to this encounter. I have discussed the diagnosis with the patient and the intended plan as seen in the above orders. she has expressed understanding. The patient has received an after-visit summary and questions were answered concerning future plans. I have discussed medication side effects and warnings with the patient as well. Electronically Signed: Esme Dinero MD 
 
Current Medications after this visit Current Outpatient Medications Medication Sig  esomeprazole magnesium (NEXIUM PO) Take  by mouth.  losartan-hydroCHLOROthiazide (HYZAAR) 100-25 mg per tablet Take 1 Tab by mouth daily.  triamcinolone acetonide (KENALOG) 0.1 % ointment Apply  to affected area two (2) times a day. use thin layer  escitalopram oxalate (LEXAPRO) 10 mg tablet Take 1 Tab by mouth daily.  hydrOXYzine HCL (ATARAX) 25 mg tablet Take 1-2 Tabs by mouth three (3) times daily as needed for Anxiety.  omeprazole (PRILOSEC) 40 mg capsule Take 1 Cap by mouth daily.  aspirin delayed-release 81 mg tablet Take  by mouth daily.  rosuvastatin (CRESTOR) 5 mg tablet Take 1 Tab by mouth nightly.  ferrous sulfate (SLOW FE) 142 mg (45 mg iron) ER tablet Take  by mouth Daily (before breakfast).  multivitamin (ONE A DAY) tablet Take 1 Tab by mouth daily. No current facility-administered medications for this visit. There are no discontinued medications. ~~~~~~~~~~~~~~~~~~~~~~~~~~~~~~~~~~~~~~~~~~~~~~ No chief complaint on file. History provided by {Relatives - adult:5061::\"patient\"} History of Present Illness Richie Grayson is a 72 y.o. female who presents to clinic today for: 
 
{Blank Single Select Template:20061::\"Depression\",\"Anxiety\",\"***\"} Review: Patient is seen for {anxdep:13661}. Ongoig symptoms include: {Depressive Symptoms:54544} {anxiety sx:75605}. Patient denies: {Depressive Symptoms:48286} {anxiety sx:78202}. Current treatment includes {tx:12483} and {anxiety tx:1010}. Reported side effects from the treatment: {side effects:26503} Prior treatments: {tx:59469} 3 most recent PHQ Screens 10/12/2020 Little interest or pleasure in doing things Nearly every day Feeling down, depressed, irritable, or hopeless Nearly every day Total Score PHQ 2 6 Trouble falling or staying asleep, or sleeping too much Nearly every day Feeling tired or having little energy Nearly every day Poor appetite, weight loss, or overeating Nearly every day Feeling bad about yourself - or that you are a failure or have let yourself or your family down Nearly every day Trouble concentrating on things such as school, work, reading, or watching TV Nearly every day Moving or speaking so slowly that other people could have noticed; or the opposite being so fidgety that others notice Nearly every day Thoughts of being better off dead, or hurting yourself in some way Nearly every day PHQ 9 Score 27 How difficult have these problems made it for you to do your work, take care of your home and get along with others -  
 
OLIVIA 2/7 10/12/2020 Feeling nervous, anxious or on edge? 3 Not being able to stop or control worrying? 3 OLIVIA-2 Subtotal 6 Worrying too much about different things? 3 Trouble relaxing? 3 Being so restless that it is hard to sit still? 3 Becoming easily annoyed or irritable? 3 Feeling afraid as if something awful might happen? 3 OLIVIA-7 Total Score 21  
 
 
 
 
 
*** Patient present with cc of *** Health Maintenance {Blank Multiple Select Template:20062::\"VIIS queried and reviewed; updated in chart as appropriate. \",\"Completed by outside provider. Release for records signed. \",\" recommendation discussed and ordered with patient's permission. \",\"*** scheduled with an outside provider; advised to have information forwarded once completed. \",\"Asked patient to schedule a Wellness appt to discuss issues. \"} Health Maintenance Due Topic Date Due  
 Hepatitis C Screening  1950  
 DTaP/Tdap/Td series (1 - Tdap) 06/15/1972  Shingrix Vaccine Age 50> (1 of 2) 06/15/2001  Colorectal Cancer Screening Combo  06/15/2001  Breast Cancer Screen Mammogram  06/15/2001  GLAUCOMA SCREENING Q2Y  06/15/2016  Bone Densitometry (Dexa) Screening  06/15/2016  Pneumococcal 65+ years (2 of 2 - PPSV23) 11/19/2016  Medicare Yearly Exam  07/19/2018  Lipid Screen  08/04/2019 Review of Systems ROS *** Vitals/Objective: There were no vitals filed for this visit. There is no height or weight on file to calculate BMI. Wt Readings from Last 3 Encounters:  
05/29/19 146 lb (66.2 kg) 08/22/18 146 lb (66.2 kg) Physical Exam *** No results found for this or any previous visit (from the past 24 hour(s)). Disposition Future Appointments Date Time Provider Christine Martinez 11/17/2020 11:20 AM Kelly Bowen MD [facility] History Patient's past medical, surgical and family histories were reviewed and updated. Past Medical History:  
Diagnosis Date  Carotid artery stenosis  Depression  GERD (gastroesophageal reflux disease)  Hypercholesteremia  Hypertension  Suicide attempt ([address] Past Surgical History:  
Procedure Laterality Date [address] UNLISTED \"bowel Stinosis correction\"  HX GI    
 HX GYN    
 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    
 eye surgery  HX TONSILLECTOMY Family History Problem Relation Age of Onset  Heart Disease Father Social History Tobacco Use  Smoking status: Current Every Day Smoker Packs/day: 1.00   Years: 55.00  
 Pack years: 55.00  Smokeless tobacco: Never Used Substance Use Topics  Alcohol use: Yes Alcohol/week: 24.0 standard drinks Types: 24 Cans of beer per week Comment: 24 beer/ week, doubled intake since  passed  Drug use: Yes Frequency: 7.0 times per week Types: Marijuana Comment: Patient states she smokes marijuana Allergies No Known Allergies

## 2020-11-17 NOTE — PROGRESS NOTES
Chanda Andrea is a 72 y.o. female      Chief Complaint   Patient presents with    Follow-up     Medication f/u visit          1. Have you been to the ER, urgent care clinic since your last visit? Hospitalized since your last visit? No      2. Have you seen or consulted any other health care providers outside of the [address] [place] [address] since your last visit? Include any pap smears or colon screening.    nO

## 2020-11-17 NOTE — PROGRESS NOTES
Lul Souza  72 y.o. female  1950  COA:[id number]    [facility] [address] [facility]  Progress Note     Encounter Date: 11/17/2020    Lul Souza is a 72 y.o. female who was seen by synchronous (real-time) audio-video technology on 11/17/2020. She and/or her healthcare decision maker is aware that this patient-initiated Telehealth encounter is a billable service, with coverage as determined by her insurance carrier. She  is aware that she may receive a bill and has provided verbal consent to proceed:Yes    I was in the office while conducting this encounter. The patient was checked in/\"roomed\" by Chirag Ken LPN via telephone in the office. The patient was at home during the encounter. This visit was completed virtually using D[employer]. me  Assessment and Plan:     Encounter Diagnoses     ICD-10-CM ICD-9-CM   1. Other infective acute otitis externa of both ears  H60.393 380.10   2. Severe episode of recurrent major depressive disorder, without psychotic features ([address]  F33.2 296.33   3. Essential hypertension  I10 401.9       1. Severe episode of recurrent major depressive disorder, without psychotic features ([address]  Improved. Continue current dose of medication. F/u 3 months. - escitalopram oxalate (LEXAPRO) 10 mg tablet; Take 1 Tab by mouth daily. Dispense: 90 Tab; Refill: 0  - hydrOXYzine HCL (ATARAX) 25 mg tablet; Take 1-2 Tabs by mouth three (3) times daily as needed for Anxiety. Dispense: 90 Tab; Refill: 0    2. Essential hypertension  Improved. Home monitoring at goal. Continue current medication.   - losartan-hydroCHLOROthiazide (HYZAAR) 100-25 mg per tablet; Take 1 Tab by mouth daily. Dispense: 90 Tab; Refill: 0    3. Other infective acute otitis externa of both ears  - azelastine (ASTELIN) 137 mcg (0.1 %) nasal spray; 1 [place] by Both Nostrils route two (2) times a day. Use in each nostril as directed  Dispense: 1 Bottle;  Refill: 2  - neomycin-polymyxin-hydrocortisone, buffered, (PEDIOTIC) 3.5-10,000-1 mg/mL-unit/mL-% otic suspension; Administer 3 Drops in left ear four (4) times daily for 10 days. Dispense: 10 mL; Refill: 0      We discussed the expected course, resolution and complications of the diagnosis(es) in detail. Medication risks, benefits, costs, interactions, and alternatives were discussed as indicated. I advised her to contact the office if her condition worsens, changes or fails to improve as anticipated. She expressed understanding with the diagnosis(es) and plan. CPT Codes 68804-79223 for Established Patients may apply to this Telehealth Visit    Pursuant to the emergency declaration under the [address] [place] [address], [facility] waiver authority and the [employer] Act, this Virtual  Visit was conducted, with patient's consent, to reduce the patient's risk of exposure to COVID-19 and provide continuity of care for an established patient. Services were provided through a video synchronous discussion virtually to substitute for in-person clinic visit. Electronically Signed: Debbi Thomas MD    Current Medications after this visit     Current Outpatient Medications   Medication Sig    escitalopram oxalate (LEXAPRO) 10 mg tablet Take 1 Tab by mouth daily.  losartan-hydroCHLOROthiazide (HYZAAR) 100-25 mg per tablet Take 1 Tab by mouth daily.  hydrOXYzine HCL (ATARAX) 25 mg tablet Take 1-2 Tabs by mouth three (3) times daily as needed for Anxiety.  azelastine (ASTELIN) 137 mcg (0.1 %) nasal spray 1 [place] by Both Nostrils route two (2) times a day. Use in each nostril as directed    neomycin-polymyxin-hydrocortisone, buffered, (PEDIOTIC) 3.5-10,000-1 mg/mL-unit/mL-% otic suspension Administer 3 Drops in left ear four (4) times daily for 10 days.  esomeprazole magnesium (NEXIUM PO) Take  by mouth.     triamcinolone acetonide (KENALOG) 0.1 % ointment Apply  to affected area two (2) times a day. use thin layer    aspirin delayed-release 81 mg tablet Take  by mouth daily.  rosuvastatin (CRESTOR) 5 mg tablet Take 1 Tab by mouth nightly.  ferrous sulfate (SLOW FE) 142 mg (45 mg iron) ER tablet Take  by mouth Daily (before breakfast).  multivitamin (ONE A DAY) tablet Take 1 Tab by mouth daily.  omeprazole (PRILOSEC) 40 mg capsule Take 1 Cap by mouth daily. No current facility-administered medications for this visit. Medications Discontinued During This Encounter   Medication Reason    escitalopram oxalate (LEXAPRO) 10 mg tablet REORDER    losartan-hydroCHLOROthiazide (HYZAAR) 100-25 mg per tablet REORDER    hydrOXYzine HCL (ATARAX) 25 mg tablet REORDER     ~~~~~~~~~~~~~~~~~~~~~~~~~~~~~~~~~~~~~~~~~~~~~~    Chief Complaint   Patient presents with    Follow-up     Medication f/u visit        History of Present Illness   Barbie Martinez is a 72 y.o. female who presents for:    Hypertension: Improved   BP Readings from Last 3 Encounters:   10/12/20 (!) 158/106   05/29/19 (!) 149/92   08/22/18 (!) 178/100     The patient reports:  taking medications as instructed, no medication side effects noted, no TIA's, no chest pain on exertion, no dyspnea on exertion, no swelling of ankles. Home monitoring:Yes: Comment: SBP <140      Anxiety Review:  Patient is seen for anxiety disorder. She reports that she is no longer as tearful. Ongoig symptoms include: depressed mood feelings of losing control. Patient denies: anhedonia and appetite loss feelings of losing control, suicidal ideation, homocidal ideation. Current treatment includes Lexapro and no other therapies. Reported side effects from the treatment: none    Review of Systems   Review of Systems   Constitutional: Negative for chills and fever. HENT: Positive for ear discharge and ear pain. Negative for congestion, sinus pain and sore throat. Eyes: Negative for double vision, photophobia and discharge.    Respiratory: Negative for cough, sputum production, shortness of breath and wheezing. Cardiovascular: Negative for chest pain, palpitations and leg swelling. Gastrointestinal: Negative for abdominal pain, diarrhea, nausea and vomiting. Genitourinary: Negative for dysuria and urgency. Skin: Negative. Neurological: Negative for dizziness, tremors and headaches. Psychiatric/Behavioral: The patient is nervous/anxious (though imporved. ). Vitals/Objective:     Due to this being a TeleHealth evaluation, many elements of the physical examination are unable to be assessed. Physical Exam  Constitutional:       General: She is not in acute distress. Appearance: Normal appearance. She is normal weight. She is not ill-appearing. HENT:      Head: Normocephalic and atraumatic. Right Ear: External ear normal.      Left Ear: External ear normal.      Mouth/Throat:      Mouth: Mucous membranes are moist.   Eyes:      General:         Right eye: No discharge. Left eye: No discharge. Extraocular Movements: Extraocular movements intact. Conjunctiva/sclera: Conjunctivae normal.   Neck:      Musculoskeletal: Normal range of motion. Pulmonary:      Effort: Pulmonary effort is normal.      Comments: No visualized signs of difficulty breathing or respiratory distress  Skin:     Coloration: Skin is not pale. Findings: No erythema or rash. Neurological:      Mental Status: She is alert and oriented to person, place, and time. Cranial Nerves: No cranial nerve deficit ( No Facial Asymmetry (Cranial nerve 7 motor function) (limited exam due to video visit) ). Comments: Able to follow commands    Psychiatric:         Mood and Affect: Mood normal.         Behavior: Behavior normal.         No results found for this or any previous visit (from the past 24 hour(s)).    Disposition     Follow-up and Dispositions  ·   Return in about 3 months (around 2/17/2021) for Routine (Chronic Conditions). No future appointments. History   Patient's past medical, surgical and family histories were reviewed and updated.     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    HX ORTHOPAEDIC      Metal plates in neck    HX OTHER SURGICAL      eye surgery    HX TONSILLECTOMY       Family History   Problem Relation Age of Onset    Heart Disease Father      Social History     Tobacco Use    Smoking status: Current Every Day Smoker     Packs/day: 1.00     Years: 55.00     Pack years: 55.00    Smokeless tobacco: Never Used   Substance Use Topics    Alcohol use: Not Currently     Alcohol/week: 24.0 standard drinks     Types: 24 Cans of beer per week     Comment: 24 beer/ week, doubled intake since  passed    Drug use: Yes     Frequency: 7.0 times per week     Types: Marijuana     Comment: Patient states she smokes marijuana       Allergies   No Known Allergies

## 2020-12-10 DIAGNOSIS — F33.2 SEVERE EPISODE OF RECURRENT MAJOR DEPRESSIVE DISORDER, WITHOUT PSYCHOTIC FEATURES (HCC): ICD-10-CM

## 2020-12-10 RX ORDER — HYDROXYZINE 25 MG/1
25-50 TABLET, FILM COATED ORAL
Qty: 90 TAB | Refills: 0 | Status: SHIPPED | OUTPATIENT
Start: 2020-12-10

## 2021-02-25 DIAGNOSIS — F33.2 SEVERE EPISODE OF RECURRENT MAJOR DEPRESSIVE DISORDER, WITHOUT PSYCHOTIC FEATURES (HCC): ICD-10-CM

## 2021-02-25 RX ORDER — ESCITALOPRAM OXALATE 10 MG/1
TABLET ORAL
Qty: 30 TAB | Refills: 0 | OUTPATIENT
Start: 2021-02-25

## 2021-02-25 NOTE — TELEPHONE ENCOUNTER
30-day supply of medication filled last month. Patient is required to have an appointment for chronic conditions prior to further refills.      Guevara Chong MD

## 2022-03-18 PROBLEM — I65.29 CAROTID ARTERY STENOSIS: Status: ACTIVE | Noted: 2018-08-10

## 2022-03-19 PROBLEM — I10 ESSENTIAL HYPERTENSION: Status: ACTIVE | Noted: 2018-08-10

## 2023-05-12 RX ORDER — AZELASTINE 1 MG/ML
1 SPRAY, METERED NASAL 2 TIMES DAILY
COMMUNITY
Start: 2020-11-17

## 2023-05-12 RX ORDER — HYDROXYZINE HYDROCHLORIDE 25 MG/1
TABLET, FILM COATED ORAL 3 TIMES DAILY PRN
COMMUNITY
Start: 2020-12-10

## 2023-05-12 RX ORDER — LOSARTAN POTASSIUM AND HYDROCHLOROTHIAZIDE 25; 100 MG/1; MG/1
1 TABLET ORAL DAILY
COMMUNITY
Start: 2020-11-17

## 2023-05-12 RX ORDER — OMEPRAZOLE 40 MG/1
CAPSULE, DELAYED RELEASE ORAL DAILY
COMMUNITY
Start: 2020-06-25

## 2023-05-12 RX ORDER — ROSUVASTATIN CALCIUM 5 MG/1
1 TABLET, COATED ORAL NIGHTLY
COMMUNITY
Start: 2020-12-17

## 2023-05-12 RX ORDER — ASPIRIN 81 MG/1
TABLET ORAL DAILY
COMMUNITY

## 2023-05-12 RX ORDER — ESCITALOPRAM OXALATE 10 MG/1
1 TABLET ORAL DAILY
COMMUNITY
Start: 2021-02-08

## 2025-03-20 ENCOUNTER — OFFICE VISIT (OUTPATIENT)
Facility: CLINIC | Age: 75
End: 2025-03-20
Payer: MEDICARE

## 2025-03-20 VITALS
TEMPERATURE: 97.6 F | RESPIRATION RATE: 17 BRPM | BODY MASS INDEX: 22.56 KG/M2 | DIASTOLIC BLOOD PRESSURE: 96 MMHG | HEART RATE: 70 BPM | WEIGHT: 135.4 LBS | SYSTOLIC BLOOD PRESSURE: 195 MMHG | HEIGHT: 65 IN | OXYGEN SATURATION: 96 %

## 2025-03-20 DIAGNOSIS — M25.551 PAIN OF RIGHT HIP: ICD-10-CM

## 2025-03-20 DIAGNOSIS — M54.41 ACUTE BILATERAL LOW BACK PAIN WITH BILATERAL SCIATICA: Primary | ICD-10-CM

## 2025-03-20 DIAGNOSIS — M54.42 ACUTE BILATERAL LOW BACK PAIN WITH BILATERAL SCIATICA: Primary | ICD-10-CM

## 2025-03-20 DIAGNOSIS — I10 ESSENTIAL HYPERTENSION: ICD-10-CM

## 2025-03-20 PROCEDURE — 1123F ACP DISCUSS/DSCN MKR DOCD: CPT | Performed by: FAMILY MEDICINE

## 2025-03-20 PROCEDURE — 3017F COLORECTAL CA SCREEN DOC REV: CPT | Performed by: FAMILY MEDICINE

## 2025-03-20 PROCEDURE — 99214 OFFICE O/P EST MOD 30 MIN: CPT | Performed by: FAMILY MEDICINE

## 2025-03-20 PROCEDURE — G2211 COMPLEX E/M VISIT ADD ON: HCPCS | Performed by: FAMILY MEDICINE

## 2025-03-20 PROCEDURE — 1126F AMNT PAIN NOTED NONE PRSNT: CPT | Performed by: FAMILY MEDICINE

## 2025-03-20 PROCEDURE — G8420 CALC BMI NORM PARAMETERS: HCPCS | Performed by: FAMILY MEDICINE

## 2025-03-20 PROCEDURE — 4004F PT TOBACCO SCREEN RCVD TLK: CPT | Performed by: FAMILY MEDICINE

## 2025-03-20 PROCEDURE — G8400 PT W/DXA NO RESULTS DOC: HCPCS | Performed by: FAMILY MEDICINE

## 2025-03-20 PROCEDURE — 1159F MED LIST DOCD IN RCRD: CPT | Performed by: FAMILY MEDICINE

## 2025-03-20 PROCEDURE — G8427 DOCREV CUR MEDS BY ELIG CLIN: HCPCS | Performed by: FAMILY MEDICINE

## 2025-03-20 PROCEDURE — 3080F DIAST BP >= 90 MM HG: CPT | Performed by: FAMILY MEDICINE

## 2025-03-20 PROCEDURE — 3077F SYST BP >= 140 MM HG: CPT | Performed by: FAMILY MEDICINE

## 2025-03-20 PROCEDURE — 1090F PRES/ABSN URINE INCON ASSESS: CPT | Performed by: FAMILY MEDICINE

## 2025-03-20 RX ORDER — CYCLOBENZAPRINE HCL 10 MG
10 TABLET ORAL 3 TIMES DAILY PRN
Qty: 30 TABLET | Refills: 0 | Status: SHIPPED | OUTPATIENT
Start: 2025-03-20 | End: 2025-03-20 | Stop reason: SDUPTHER

## 2025-03-20 RX ORDER — CYCLOBENZAPRINE HCL 10 MG
10 TABLET ORAL 3 TIMES DAILY PRN
Qty: 30 TABLET | Refills: 0 | Status: SHIPPED | OUTPATIENT
Start: 2025-03-20 | End: 2025-03-30

## 2025-03-20 RX ORDER — LOSARTAN POTASSIUM AND HYDROCHLOROTHIAZIDE 25; 100 MG/1; MG/1
1 TABLET ORAL DAILY
Qty: 90 TABLET | Refills: 1 | Status: SHIPPED | OUTPATIENT
Start: 2025-03-20

## 2025-03-20 RX ORDER — LOSARTAN POTASSIUM AND HYDROCHLOROTHIAZIDE 25; 100 MG/1; MG/1
1 TABLET ORAL DAILY
Qty: 90 TABLET | Refills: 1 | Status: SHIPPED | OUTPATIENT
Start: 2025-03-20 | End: 2025-03-20 | Stop reason: SDUPTHER

## 2025-03-20 RX ORDER — ESOMEPRAZOLE MAGNESIUM 20 MG/1
20 GRANULE, DELAYED RELEASE ORAL DAILY
COMMUNITY

## 2025-03-20 SDOH — ECONOMIC STABILITY: FOOD INSECURITY: WITHIN THE PAST 12 MONTHS, YOU WORRIED THAT YOUR FOOD WOULD RUN OUT BEFORE YOU GOT MONEY TO BUY MORE.: NEVER TRUE

## 2025-03-20 SDOH — ECONOMIC STABILITY: FOOD INSECURITY: WITHIN THE PAST 12 MONTHS, THE FOOD YOU BOUGHT JUST DIDN'T LAST AND YOU DIDN'T HAVE MONEY TO GET MORE.: NEVER TRUE

## 2025-03-20 ASSESSMENT — ENCOUNTER SYMPTOMS
BACK PAIN: 1
BLOOD IN STOOL: 0
SHORTNESS OF BREATH: 0

## 2025-03-20 ASSESSMENT — PATIENT HEALTH QUESTIONNAIRE - PHQ9
SUM OF ALL RESPONSES TO PHQ QUESTIONS 1-9: 2
1. LITTLE INTEREST OR PLEASURE IN DOING THINGS: NOT AT ALL
SUM OF ALL RESPONSES TO PHQ QUESTIONS 1-9: 2
SUM OF ALL RESPONSES TO PHQ QUESTIONS 1-9: 2
2. FEELING DOWN, DEPRESSED OR HOPELESS: MORE THAN HALF THE DAYS
SUM OF ALL RESPONSES TO PHQ QUESTIONS 1-9: 2

## 2025-03-20 NOTE — PROGRESS NOTES
\"Have you been to the ER, urgent care clinic since your last visit?  Hospitalized since your last visit?\"    NO    “Have you seen or consulted any other health care providers outside our system since your last visit?”    NO    Have you had a mammogram?”   NO    No breast cancer screening on file       “Have you had a colorectal cancer screening such as a colonoscopy/FIT/Cologuard?    NO    Date of last Colonoscopy: 3/23/2010  No cologuard on file  No FIT/FOBT on file   No flexible sigmoidoscopy on file           
  Pulmonary:      Effort: Pulmonary effort is normal. No respiratory distress.      Breath sounds: Normal breath sounds. No wheezing, rhonchi or rales.   Lymphadenopathy:      Cervical: No cervical adenopathy.   Neurological:      Mental Status: She is alert and oriented to person, place, and time.   Psychiatric:         Mood and Affect: Mood normal.         Behavior: Behavior normal.         Thought Content: Thought content normal.         Judgment: Judgment normal.        BACK EXAM:   Inspection: normal skin, soft tissue and bony appearance with normal cervical and lumbar lordotic curve, no gross edema or evidence of acute injury;   Palpation: normal palpation of the bony posterior spinous processes, the sacral spine, iliac crest, posterior iliac spine and coccyx  without soft tissue abnormality;   Neurovascular: deep tendon reflexes: 2/4 left patellar,  2/4 right patellar,  2/4 left Achilles,  2/4 right Achilles, Symmetric Reflexes without clonus;   Muscular Strength: 5/5 bilateral quadriceps;  5/5 bilateral tibialis anterior;  5/5 bilateral extensor hallucis longus;  5/5 bilateral extensor digitorum longus and brevis;  5/5 bilateral peroneus longus and brevis;  5/5 bilateral gastrocnemius-soleus;   Range of Motion: painful flexion and extension of lumbar back as well as with lateral flexion both left and right     (+) bilateral straight leg raise     FROM left hip without pain  Right hip marked pain with internal rotation of hip    No results found for this or any previous visit (from the past 24 hours).   Disposition   Return in about 3 weeks (around 4/10/2025) for Medication follow up, Test results.   History   Patient's past medical, surgical and family histories were reviewed and updated.    Past Medical History:   Diagnosis Date    Carotid artery stenosis     Depression     GERD (gastroesophageal reflux disease)     Hypercholesteremia     Hypertension     Suicide attempt (HCC)      Past Surgical History:

## 2025-04-10 ENCOUNTER — OFFICE VISIT (OUTPATIENT)
Facility: CLINIC | Age: 75
End: 2025-04-10
Payer: MEDICARE

## 2025-04-10 VITALS
TEMPERATURE: 97.3 F | RESPIRATION RATE: 17 BRPM | HEIGHT: 65 IN | BODY MASS INDEX: 22.79 KG/M2 | DIASTOLIC BLOOD PRESSURE: 92 MMHG | WEIGHT: 136.8 LBS | HEART RATE: 84 BPM | OXYGEN SATURATION: 98 % | SYSTOLIC BLOOD PRESSURE: 150 MMHG

## 2025-04-10 DIAGNOSIS — M25.551 PAIN OF RIGHT HIP: ICD-10-CM

## 2025-04-10 DIAGNOSIS — I10 ESSENTIAL HYPERTENSION: ICD-10-CM

## 2025-04-10 DIAGNOSIS — M54.42 ACUTE BILATERAL LOW BACK PAIN WITH BILATERAL SCIATICA: ICD-10-CM

## 2025-04-10 DIAGNOSIS — M81.0 OSTEOPOROSIS WITHOUT CURRENT PATHOLOGICAL FRACTURE, UNSPECIFIED OSTEOPOROSIS TYPE: ICD-10-CM

## 2025-04-10 DIAGNOSIS — F41.9 ANXIETY: Primary | ICD-10-CM

## 2025-04-10 DIAGNOSIS — F17.200 SMOKER: ICD-10-CM

## 2025-04-10 DIAGNOSIS — F33.1 MODERATE EPISODE OF RECURRENT MAJOR DEPRESSIVE DISORDER (HCC): ICD-10-CM

## 2025-04-10 DIAGNOSIS — M54.41 ACUTE BILATERAL LOW BACK PAIN WITH BILATERAL SCIATICA: ICD-10-CM

## 2025-04-10 DIAGNOSIS — C44.90: ICD-10-CM

## 2025-04-10 PROCEDURE — G8420 CALC BMI NORM PARAMETERS: HCPCS | Performed by: FAMILY MEDICINE

## 2025-04-10 PROCEDURE — 1123F ACP DISCUSS/DSCN MKR DOCD: CPT | Performed by: FAMILY MEDICINE

## 2025-04-10 PROCEDURE — 3077F SYST BP >= 140 MM HG: CPT | Performed by: FAMILY MEDICINE

## 2025-04-10 PROCEDURE — 99214 OFFICE O/P EST MOD 30 MIN: CPT | Performed by: FAMILY MEDICINE

## 2025-04-10 PROCEDURE — G8400 PT W/DXA NO RESULTS DOC: HCPCS | Performed by: FAMILY MEDICINE

## 2025-04-10 PROCEDURE — 3080F DIAST BP >= 90 MM HG: CPT | Performed by: FAMILY MEDICINE

## 2025-04-10 PROCEDURE — G8428 CUR MEDS NOT DOCUMENT: HCPCS | Performed by: FAMILY MEDICINE

## 2025-04-10 PROCEDURE — G2211 COMPLEX E/M VISIT ADD ON: HCPCS | Performed by: FAMILY MEDICINE

## 2025-04-10 PROCEDURE — 4004F PT TOBACCO SCREEN RCVD TLK: CPT | Performed by: FAMILY MEDICINE

## 2025-04-10 PROCEDURE — 1126F AMNT PAIN NOTED NONE PRSNT: CPT | Performed by: FAMILY MEDICINE

## 2025-04-10 PROCEDURE — 3017F COLORECTAL CA SCREEN DOC REV: CPT | Performed by: FAMILY MEDICINE

## 2025-04-10 PROCEDURE — 1090F PRES/ABSN URINE INCON ASSESS: CPT | Performed by: FAMILY MEDICINE

## 2025-04-10 RX ORDER — MIRTAZAPINE 30 MG/1
30 TABLET, FILM COATED ORAL NIGHTLY
Qty: 30 TABLET | Refills: 1 | Status: SHIPPED | OUTPATIENT
Start: 2025-04-10

## 2025-04-10 ASSESSMENT — ENCOUNTER SYMPTOMS: SHORTNESS OF BREATH: 0

## 2025-04-10 NOTE — PROGRESS NOTES
\"Have you been to the ER, urgent care clinic since your last visit?  Hospitalized since your last visit?\"    NO    “Have you seen or consulted any other health care providers outside our system since your last visit?”    NO    Have you had a mammogram?”   NO    No breast cancer screening on file       “Have you had a colorectal cancer screening such as a colonoscopy/FIT/Cologuard?    NO    Date of last Colonoscopy: 3/23/2010  No cologuard on file  No FIT/FOBT on file   No flexible sigmoidoscopy on file

## 2025-04-10 NOTE — PROGRESS NOTES
Afua Long  74 y.o. female  1950  MRN:[id number]  [facility]  Progress Note     Encounter Date: 4/10/2025    Assessment and Plan:       ICD-10-CM    1. Anxiety  F41.9 mirtazapine (REMERON) 30 MG tablet      2. Essential hypertension  I10       3. Smoker  F17.200       4. Pain of right hip  M25.551 Zak Wallis MD, Orthopedic Surgery (hip), [place]      5. Moderate episode of recurrent major depressive disorder (HCC)  F33.1 mirtazapine (REMERON) 30 MG tablet      6. Acute bilateral low back pain with bilateral sciatica  M54.42     M54.41       7. Osteoporosis without current pathological fracture, unspecified osteoporosis type  M81.0 DEXA BONE DENSITY AXIAL SKELETON      8. Neoplasm of skin, malignant  C44.90 Cee Hook MD, Dermatology, [place] ([facility])        1. Essential hypertension  Now back on BP med and doing better, she has been off of ETOH for about three weeks and feels better.  Thinks that her anxiety and worry has a lot to do with her BP issues.  2. Smoker  Advised to quit  3. Anxiety-  Still has anxiety and depression.  Focuses on loss of  seven years ago.  Sleeps poorly.  Trial of remeron for sleep, anxiety and depression.  Continue to avoid ETOH  FU 3-4 weeks.  -     mirtazapine (REMERON) 30 MG tablet; Take 1 tablet by mouth nightly, Disp-30 tablet, R-1Normal  4. Pain of right hip  Xrays show DJD and possible avascular necrosis  To Ortho  -     Zak Wallis MD, Orthopedic Surgery (hip), [place]  5. Moderate episode of recurrent major depressive disorder (HCC)  As above  -     mirtazapine (REMERON) 30 MG tablet; Take 1 tablet by mouth nightly, Disp-30 tablet, R-1Normal  6. Acute bilateral low back pain with bilateral sciatica  Back pain has improved but has compression fracture T 12  Unsure when that occurred.  May or may not be related to more recent back pain.  Has had dexa but that was \"years ago\".  Will recheck

## 2025-04-16 DIAGNOSIS — M25.551 PAIN OF RIGHT HIP: ICD-10-CM

## 2025-04-16 DIAGNOSIS — M54.41 ACUTE BILATERAL LOW BACK PAIN WITH BILATERAL SCIATICA: ICD-10-CM

## 2025-04-16 DIAGNOSIS — M54.42 ACUTE BILATERAL LOW BACK PAIN WITH BILATERAL SCIATICA: ICD-10-CM

## 2025-04-16 PROCEDURE — 73502 X-RAY EXAM HIP UNI 2-3 VIEWS: CPT | Performed by: FAMILY MEDICINE

## 2025-04-16 PROCEDURE — 72100 X-RAY EXAM L-S SPINE 2/3 VWS: CPT | Performed by: FAMILY MEDICINE

## 2025-05-02 DIAGNOSIS — F41.9 ANXIETY: ICD-10-CM

## 2025-05-02 DIAGNOSIS — F33.1 MODERATE EPISODE OF RECURRENT MAJOR DEPRESSIVE DISORDER (HCC): ICD-10-CM

## 2025-05-02 RX ORDER — MIRTAZAPINE 30 MG/1
30 TABLET, FILM COATED ORAL NIGHTLY
Qty: 90 TABLET | Refills: 1 | Status: SHIPPED | OUTPATIENT
Start: 2025-05-02

## 2025-08-22 ENCOUNTER — COMMUNITY OUTREACH (OUTPATIENT)
Facility: CLINIC | Age: 75
End: 2025-08-22